# Patient Record
Sex: FEMALE | URBAN - METROPOLITAN AREA
[De-identification: names, ages, dates, MRNs, and addresses within clinical notes are randomized per-mention and may not be internally consistent; named-entity substitution may affect disease eponyms.]

---

## 2021-09-22 ENCOUNTER — APPOINTMENT (RX ONLY)
Dept: URBAN - METROPOLITAN AREA CLINIC 151 | Facility: CLINIC | Age: 71
Setting detail: DERMATOLOGY
End: 2021-09-22

## 2021-09-22 DIAGNOSIS — L30.9 DERMATITIS, UNSPECIFIED: ICD-10-CM

## 2021-09-22 DIAGNOSIS — L82.1 OTHER SEBORRHEIC KERATOSIS: ICD-10-CM

## 2021-09-22 PROCEDURE — ? PRESCRIPTION

## 2021-09-22 PROCEDURE — ? DIAGNOSIS COMMENT

## 2021-09-22 PROCEDURE — 99203 OFFICE O/P NEW LOW 30 MIN: CPT

## 2021-09-22 RX ORDER — PREDNISONE 10 MG/1
TABLET ORAL
Qty: 32 | Refills: 0 | Status: ERX | COMMUNITY
Start: 2021-09-22

## 2021-09-22 RX ADMIN — PREDNISONE: 10 TABLET ORAL at 00:00

## 2021-09-22 ASSESSMENT — LOCATION DETAILED DESCRIPTION DERM: LOCATION DETAILED: RIGHT VENTRAL DISTAL FOREARM

## 2021-09-22 ASSESSMENT — LOCATION ZONE DERM: LOCATION ZONE: ARM

## 2021-09-22 ASSESSMENT — LOCATION SIMPLE DESCRIPTION DERM: LOCATION SIMPLE: RIGHT FOREARM

## 2021-09-22 NOTE — HPI: OTHER
Condition:: Rash
Please Describe Your Condition:: Diffuse rash, sudden onset started over the weekend 5-6 days ago\\nPatient notes that itching started while working with wood and lichen\\nStarted on her chest, but spread, seems to spread from one side of the body to the other\\n+itch 6/10 but 10/10 at times\\n+burning\\nWorse afternoon-night, keeps her awake\\nWent to urgent care Sunday, they gave her IM Solu-Medrol 125mg\\nAlso transported her to ED for concerns of anaphylaxis (swollen throat and lips), she was monitored there but does not recall any additional in hospital treatment, was prescribed prednisone 40mg QD x 5 days\\nCurrently on day 3 of prednisone, also using clobetasol 0.05% cream which helps and taking oral Benadryl\\nSome improvement with steroids, but new patches still appearing\\nNo new medications within the last 2-3 months

## 2021-09-22 NOTE — PROCEDURE: DIAGNOSIS COMMENT
Render Risk Assessment In Note?: yes
Patient Management Risk Assessment: Moderate
Comment: ?atypical drug rash\\nDiscussed possible etiologies including environmental and viral\\nStart 16 day prednisone taper, starting from day 3 since she is already on prednisone 40mg
Detail Level: Zone